# Patient Record
Sex: FEMALE | Race: WHITE | NOT HISPANIC OR LATINO | Employment: OTHER | URBAN - METROPOLITAN AREA
[De-identification: names, ages, dates, MRNs, and addresses within clinical notes are randomized per-mention and may not be internally consistent; named-entity substitution may affect disease eponyms.]

---

## 2017-04-04 ENCOUNTER — TELEPHONE (OUTPATIENT)
Dept: NEUROSURGERY | Facility: CLINIC | Age: 82
End: 2017-04-04

## 2017-04-04 NOTE — TELEPHONE ENCOUNTER
----- Message from Marcy Koo sent at 4/4/2017  1:05 PM CDT -----  Contact: self @ [phone]  Pt received a recall letter requesting she make a f/u appt with dr headley around 5-28-17.  pls call pt with an appt.

## 2017-04-04 NOTE — TELEPHONE ENCOUNTER
----- Message from Milena Bernal RN sent at 4/4/2017 12:47 PM CDT -----  Contact: ANDER GUEVARA [[id number]]  Please call and schedule this patient to be seen by a PA tomorrow.  ----- Message -----     From: Rajiv Stahl     Sent: 4/4/2017  12:38 PM       To: Aníbal Alonso Staff    x_  1st Request  _  2nd Request  _  3rd Request        Who: ANDER GUEVARA [[id number]]    Why: Patient is having issues with her leg and states that it is shrinking. Please call back to advise. Patient requesting to be seen by tomorrow afternoon.    What Number to Call Back: [phone]    When to Expect a call back: (Before the end of the day)   -- if the call is after 12:00, the call back will be tomorrow.

## 2017-04-04 NOTE — TELEPHONE ENCOUNTER
RN spoke with patient. Appointment secured for 5/30/2017. Address on file verified. Location and directions provided to [facility]. Thanked RN for calling. Appointment letter mailed.

## 2017-04-04 NOTE — TELEPHONE ENCOUNTER
"Patient states she is having trouble with her right leg. Reports her leg "is getting shorter and looks like the bone is protruding out". Denies any pain other than her normal neuropathy. Offered patient appointment tomorrow with a PA. Patient declined appointment stating she has an appointment with her PCP tomorrow. Advised patient to consult with PCP tomorrow regarding her leg. It may be more appropriate for patient to be seen by Orthopedics. Advised patient to notify clinic if recommended she see Neurosurgery. Understanding verbalized.   "

## 2018-08-16 DIAGNOSIS — M79.642 LEFT HAND PAIN: Primary | ICD-10-CM

## 2018-08-27 ENCOUNTER — OFFICE VISIT (OUTPATIENT)
Dept: ORTHOPEDICS | Facility: CLINIC | Age: 83
End: 2018-08-27
Payer: MEDICARE

## 2018-08-27 ENCOUNTER — HOSPITAL ENCOUNTER (OUTPATIENT)
Dept: RADIOLOGY | Facility: HOSPITAL | Age: 83
Discharge: HOME | End: 2018-08-27
Attending: ORTHOPAEDIC SURGERY
Payer: MEDICARE

## 2018-08-27 VITALS
BODY MASS INDEX: 21.34 KG/M2 | DIASTOLIC BLOOD PRESSURE: 68 MMHG | HEIGHT: 62 IN | SYSTOLIC BLOOD PRESSURE: 141 MMHG | HEART RATE: 68 BPM | WEIGHT: 115.94 LBS

## 2018-08-27 DIAGNOSIS — M19.042: ICD-10-CM

## 2018-08-27 DIAGNOSIS — M79.642 LEFT HAND PAIN: ICD-10-CM

## 2018-08-27 PROCEDURE — 73130 X-RAY EXAM OF HAND: CPT | Mod: TC,FY,LT

## 2018-08-27 PROCEDURE — 73130 X-RAY EXAM OF HAND: CPT | Mod: 26,LT,, | Performed by: RADIOLOGY

## 2018-08-27 PROCEDURE — 99213 OFFICE O/P EST LOW 20 MIN: CPT | Mod: PBBFAC,25 | Performed by: ORTHOPAEDIC SURGERY

## 2018-08-27 PROCEDURE — 99999 PR PBB SHADOW E&M-EST. PATIENT-LVL III: CPT | Mod: PBBFAC,,, | Performed by: ORTHOPAEDIC SURGERY

## 2018-08-27 PROCEDURE — 99203 OFFICE O/P NEW LOW 30 MIN: CPT | Mod: S$PBB,,, | Performed by: ORTHOPAEDIC SURGERY

## 2018-08-27 NOTE — PROGRESS NOTES
"  Subjective:      Patient ID: Anastasia Soares is a 82 y.o. female.    Chief Complaint: Pain of the Left Hand    Referring Provider: No referring provider defined for this encounter.     HPI:  Ms. Soares is an 82-year-old right-hand-dominant female who presented today with complaints of malalignment of the small fingers of both of her hands and bumps forming at the PIP of her left ring finger.  She stated is progressively worsening over the last year.  She stated, They're real sore.  My hands hurt "  She has not taken NSAIDs.  She has not done occupational therapy.  Using her hands increase her symptoms.    Past Medical History:   Diagnosis Date    Arthritis     Heredia's esophagus     Esophageal stricture     GERD (gastroesophageal reflux disease)     Hyperlipidemia     PUD (peptic ulcer disease)      *  *  *  *  * Right peroneal nerve injury  Seasonal allergies  Depression  Anxiety  Headaches  Pain management 3/18/2016     Past Surgical History:   Procedure Laterality Date    BACK SURGERY      HYSTERECTOMY      lap hiatal hernia and Nissen fundoplication      NECK SURGERY       *  *  *  *  *  *  * WRIST FRACTURE SURGERY  Tubal ligation  Right breast biopsy  Breast augmentation  EGD  Colonoscopy  Cataract removal bilateral eyes  DIP fusion left middle finger.       Review of patient's allergies indicates:   Allergen Reactions    Codeine Itching    Sulfa (sulfonamide antibiotics) Itching     Social History     Occupational History    Retired      Tobacco Use    Smoking status: Former Smoker     Years: 20.00     Types: Cigarettes     Last attempt to quit: 2012     Years since quittin.6   Substance and Sexual Activity    Alcohol use: No    Drug use: No    Sexual activity: Not on file      Family History   Problem Relation Age of Onset    Heart disease Mother      Previous Hospitalizations:  Childbirth 3 times, spine surgery.    Review of Systems   Constitution: Negative " for chills and fever.   HENT: Negative for congestion.    Eyes: Negative for double vision.   Cardiovascular: Negative for chest pain.   Respiratory: Negative for cough.    Endocrine: Negative for polydipsia.   Hematologic/Lymphatic: Does not bruise/bleed easily.   Skin: Negative for poor wound healing.   Musculoskeletal: Positive for joint pain.   Gastrointestinal: Negative for constipation and diarrhea.   Genitourinary: Negative for flank pain.   Neurological: Positive for headaches.   Psychiatric/Behavioral: Positive for depression. The patient is nervous/anxious.    Allergic/Immunologic: Positive for environmental allergies.          Objective:      Physical Exam:  General: AAOx3.  No acute distress  HEENT: Normocephalic, PEARLA EOMI, Fair Dentition  Neck: Supple, No JVD  Chest: Symetric, equal excursion on inspiration  Abdomen: Soft NTND  Vascular:  Pulses intact and equal bilaterally.  Capillary refill less than 3 seconds and equal bilaterally  Neurologic:  Pinprick and soft touch intact and equal bilaterally  Integment:  No ecchymosis, no errythema  Extremity:  Hand:  Pronation/supination equal bilaterally 90/85 degrees. Dorsiflexion/volar flexion equal bilaterally 80/75 degrees. Nontender in the anatomic snuffbox bilaterally.  Nontender at the 1st dorsal compartment bilaterally. No swelling at the 1st dorsal compartment both wrists.  Nontender at the scapholunate interval both wrists.  No swelling at the scapholunate interval both wrists.  Nontender at the DRUJ/TFCC.  Nontender with forced ulnar deviation both wrists.  Ulnar deviation at PIP and DIP small fingers both hands.  Crepitus with motion joints of small fingers both hands.  Mild prominence dorsal condyles DIP left ring finger.  Tender with palpation DIP and PIP small fingers both hands.  Radiography:  Personally reviewed x-rays of the left hand completed on 08/27/2018 showed a DIP fusion with a metallic screw of the middle phalanx and arthritic  changes with severe arthritis of the DIP and PIP of the small finger and ring finger.      Assessment:       Impression:     1.  2.  3.  4.  5. PIP arthritis with angulation, left small finger.  DIP arthritis with angulation, left small finger.  PIP arthritis with angulation, right small finger.  DIP arthritis with angulation, right small finger.  DIP arthritis, left ring finger.         Plan:       1.  Discussed physical examination and radiographic findings with the patient. Anastasia understands that she has arthritis in the fingers of her hands which is progressing to angulation.  Discussed with the patient that since she has multiple involved fingers she may be better treated by seeing an orthopedic hand surgeon.  She states she would like to be referred to an orthopedic hand surgeon.     2.  Referred to an orthopedic hand surgeon.  3.  Discussed possible referral to OT, she declined for now stating she would like to save it for postoperatively if she goes to surgery.  4.  Home exercises to include include motion exercises were shown discussed with the patient.  5.  Would not recommend this patient take NSAIDs as she has Heredia's esophagitis.  6.  Follow up p.r.n., x-ray right hand at follow-up.

## 2020-11-13 ENCOUNTER — OFFICE VISIT (OUTPATIENT)
Dept: CARDIOLOGY | Facility: CLINIC | Age: 85
End: 2020-11-13
Payer: MEDICARE

## 2020-11-13 VITALS
RESPIRATION RATE: 16 BRPM | WEIGHT: 119 LBS | HEIGHT: 61 IN | OXYGEN SATURATION: 98 % | SYSTOLIC BLOOD PRESSURE: 138 MMHG | BODY MASS INDEX: 22.47 KG/M2 | DIASTOLIC BLOOD PRESSURE: 72 MMHG | HEART RATE: 74 BPM

## 2020-11-13 DIAGNOSIS — M54.2 NECK PAIN: ICD-10-CM

## 2020-11-13 DIAGNOSIS — R94.39 ABNORMAL FINDING ON CARDIOVASCULAR STRESS TEST: ICD-10-CM

## 2020-11-13 DIAGNOSIS — E78.2 MIXED HYPERLIPIDEMIA: ICD-10-CM

## 2020-11-13 DIAGNOSIS — R94.31 NONSPECIFIC ABNORMAL ELECTROCARDIOGRAM (ECG) (EKG): Primary | ICD-10-CM

## 2020-11-13 DIAGNOSIS — M54.9 BACK PAIN, UNSPECIFIED BACK LOCATION, UNSPECIFIED BACK PAIN LATERALITY, UNSPECIFIED CHRONICITY: ICD-10-CM

## 2020-11-13 DIAGNOSIS — R63.4 WEIGHT LOSS: ICD-10-CM

## 2020-11-13 PROCEDURE — 99214 PR OFFICE/OUTPT VISIT, EST, LEVL IV, 30-39 MIN: ICD-10-PCS | Mod: S$GLB,,, | Performed by: INTERNAL MEDICINE

## 2020-11-13 PROCEDURE — 99214 OFFICE O/P EST MOD 30 MIN: CPT | Mod: S$GLB,,, | Performed by: INTERNAL MEDICINE

## 2020-11-13 RX ORDER — INFLUENZA A VIRUS A/MICHIGAN/45/2015 X-275 (H1N1) ANTIGEN (FORMALDEHYDE INACTIVATED), INFLUENZA A VIRUS A/SINGAPORE/INFIMH-16-0019/2016 IVR-186 (H3N2) ANTIGEN (FORMALDEHYDE INACTIVATED), INFLUENZA B VIRUS B/PHUKET/3073/2013 ANTIGEN (FORMALDEHYDE INACTIVATED), AND INFLUENZA B VIRUS B/MARYLAND/15/2016 BX-69A ANTIGEN (FORMALDEHYDE INACTIVATED) 60; 60; 60; 60 UG/.7ML; UG/.7ML; UG/.7ML; UG/.7ML
INJECTION, SUSPENSION INTRAMUSCULAR
COMMUNITY
Start: 2020-08-19

## 2020-11-13 RX ORDER — CYCLOSPORINE 0.5 MG/ML
EMULSION OPHTHALMIC
COMMUNITY
Start: 2020-11-02

## 2020-11-13 RX ORDER — CELECOXIB 100 MG/1
100 CAPSULE ORAL DAILY
COMMUNITY
Start: 2020-10-21

## 2020-11-13 RX ORDER — HYDROCODONE BITARTRATE AND ACETAMINOPHEN 5; 325 MG/1; MG/1
TABLET ORAL
COMMUNITY
Start: 2020-09-11

## 2020-11-13 RX ORDER — LINACLOTIDE 72 UG/1
CAPSULE, GELATIN COATED ORAL
COMMUNITY
Start: 2020-08-21

## 2020-11-13 RX ORDER — DULOXETIN HYDROCHLORIDE 30 MG/1
30 CAPSULE, DELAYED RELEASE ORAL DAILY
COMMUNITY
Start: 2020-09-23

## 2020-11-13 RX ORDER — DICLOFENAC SODIUM 10 MG/G
GEL TOPICAL
COMMUNITY
Start: 2020-09-11

## 2020-11-13 RX ORDER — PREGABALIN 25 MG/1
25 CAPSULE ORAL EVERY 12 HOURS
COMMUNITY
Start: 2020-10-22
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
## 2020-12-15 ENCOUNTER — TELEPHONE (OUTPATIENT)
Dept: CARDIOLOGY | Facility: CLINIC | Age: 85
End: 2020-12-15

## 2021-05-14 ENCOUNTER — OFFICE VISIT (OUTPATIENT)
Dept: CARDIOLOGY | Facility: CLINIC | Age: 86
End: 2021-05-14
Payer: MEDICARE

## 2021-05-14 VITALS
SYSTOLIC BLOOD PRESSURE: 140 MMHG | RESPIRATION RATE: 16 BRPM | OXYGEN SATURATION: 98 % | HEART RATE: 82 BPM | BODY MASS INDEX: 20.2 KG/M2 | HEIGHT: 61 IN | DIASTOLIC BLOOD PRESSURE: 70 MMHG | WEIGHT: 107 LBS

## 2021-05-14 DIAGNOSIS — M19.042: ICD-10-CM

## 2021-05-14 DIAGNOSIS — G62.9 NEUROPATHY: ICD-10-CM

## 2021-05-14 DIAGNOSIS — R94.31 NONSPECIFIC ST-T WAVE ELECTROCARDIOGRAPHIC CHANGES: Primary | ICD-10-CM

## 2021-05-14 DIAGNOSIS — R94.39 ABNORMAL FINDING ON CARDIOVASCULAR STRESS TEST: ICD-10-CM

## 2021-05-14 DIAGNOSIS — E78.2 MIXED HYPERLIPIDEMIA: ICD-10-CM

## 2021-05-14 PROBLEM — Z00.00 ANNUAL PHYSICAL EXAM: Status: ACTIVE | Noted: 2021-05-14

## 2021-05-14 PROCEDURE — 99214 PR OFFICE/OUTPT VISIT, EST, LEVL IV, 30-39 MIN: ICD-10-PCS | Mod: S$GLB,,, | Performed by: INTERNAL MEDICINE

## 2021-05-14 PROCEDURE — 99214 OFFICE O/P EST MOD 30 MIN: CPT | Mod: S$GLB,,, | Performed by: INTERNAL MEDICINE

## 2021-05-14 PROCEDURE — 93000 EKG 12-LEAD: ICD-10-PCS | Mod: S$GLB,,, | Performed by: SPECIALIST

## 2021-05-14 PROCEDURE — 93000 ELECTROCARDIOGRAM COMPLETE: CPT | Mod: S$GLB,,, | Performed by: SPECIALIST

## 2021-05-14 RX ORDER — FAMOTIDINE 40 MG/1
40 TABLET, FILM COATED ORAL
COMMUNITY
Start: 2020-12-02

## 2021-05-14 RX ORDER — GENTAMICIN SULFATE 3 MG/ML
SOLUTION/ DROPS OPHTHALMIC
COMMUNITY
Start: 2021-02-25

## 2021-05-14 RX ORDER — DOCUSATE SODIUM 100 MG/1
100 CAPSULE, LIQUID FILLED ORAL
COMMUNITY
Start: 2020-07-31

## 2021-05-14 RX ORDER — OMEPRAZOLE 40 MG/1
40 CAPSULE, DELAYED RELEASE ORAL
COMMUNITY
Start: 2021-05-11 | End: 2022-05-06

## 2021-08-16 PROBLEM — Z00.00 ANNUAL PHYSICAL EXAM: Status: RESOLVED | Noted: 2021-05-14 | Resolved: 2021-08-16

## 2022-01-07 ENCOUNTER — OFFICE VISIT (OUTPATIENT)
Dept: CARDIOLOGY | Facility: CLINIC | Age: 87
End: 2022-01-07
Payer: MEDICARE

## 2022-01-07 VITALS
OXYGEN SATURATION: 98 % | BODY MASS INDEX: 20.2 KG/M2 | HEIGHT: 61 IN | HEART RATE: 87 BPM | RESPIRATION RATE: 16 BRPM | WEIGHT: 107 LBS | DIASTOLIC BLOOD PRESSURE: 76 MMHG | SYSTOLIC BLOOD PRESSURE: 138 MMHG

## 2022-01-07 DIAGNOSIS — R94.31 NONSPECIFIC ABNORMAL ELECTROCARDIOGRAM (ECG) (EKG): ICD-10-CM

## 2022-01-07 DIAGNOSIS — R94.39 ABNORMAL FINDING ON CARDIOVASCULAR STRESS TEST: Primary | ICD-10-CM

## 2022-01-07 DIAGNOSIS — E78.2 MIXED HYPERLIPIDEMIA: ICD-10-CM

## 2022-01-07 DIAGNOSIS — R63.4 WEIGHT LOSS: ICD-10-CM

## 2022-01-07 DIAGNOSIS — M54.2 NECK PAIN: ICD-10-CM

## 2022-01-07 DIAGNOSIS — M19.042: ICD-10-CM

## 2022-01-07 DIAGNOSIS — G89.29 CHRONIC LEFT-SIDED LOW BACK PAIN WITHOUT SCIATICA: ICD-10-CM

## 2022-01-07 DIAGNOSIS — M54.50 CHRONIC LEFT-SIDED LOW BACK PAIN WITHOUT SCIATICA: ICD-10-CM

## 2022-01-07 PROCEDURE — 99214 OFFICE O/P EST MOD 30 MIN: CPT | Mod: S$GLB,,, | Performed by: INTERNAL MEDICINE

## 2022-01-07 PROCEDURE — 99214 PR OFFICE/OUTPT VISIT, EST, LEVL IV, 30-39 MIN: ICD-10-PCS | Mod: S$GLB,,, | Performed by: INTERNAL MEDICINE

## 2022-01-07 PROCEDURE — 93000 EKG 12-LEAD: ICD-10-PCS | Mod: S$GLB,,, | Performed by: INTERNAL MEDICINE

## 2022-01-07 PROCEDURE — 93000 ELECTROCARDIOGRAM COMPLETE: CPT | Mod: S$GLB,,, | Performed by: INTERNAL MEDICINE

## 2022-01-07 NOTE — PROGRESS NOTES
Subjective:    Patient ID:  Anastasia Soares is a 86 y.o. female       Chief Complaint   Patient presents with    Follow-up       HPI:  Ms Anastasia Soares is a 86 y.o. female is here for follow-up.  Patient has been doing well except that she still has neck pain and back pain.  She has had surgery in the past for the same.  Her breathing is good denies any shortness of breath or difficulty in breathing denies any chest pain or tightness or heaviness denies any dizziness or lightheadedness or loss of can consciousness.  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  And patient has been stressed in regards with.    Review of patient's allergies indicates:   Allergen Reactions    Codeine Itching     Other reaction(s): ITCHING    Simvastatin     Sulfa (sulfonamide antibiotics) Itching    Sulfur      Other reaction(s): ITCHING       Past Medical History:   Diagnosis Date    Arthritis     Heredia's esophagus     Esophageal stricture     GERD (gastroesophageal reflux disease)     Hyperlipidemia     PUD (peptic ulcer disease)     Right peroneal nerve injury 3/18/2016     Past Surgical History:   Procedure Laterality Date    BACK SURGERY      HYSTERECTOMY      lap hiatal hernia and Nissen fundoplication      NECK SURGERY      WRIST FRACTURE SURGERY       Social History     Tobacco Use    Smoking status: Former Smoker     Years: 20.00     Types: Cigarettes     Quit date: 2012     Years since quittin.0    Smokeless tobacco: Never Used   Substance Use Topics    Alcohol use: No    Drug use: No     Family History   Problem Relation Age of Onset    Heart disease Mother     Hypertension Mother     Stroke Mother     Cancer Sister     Clotting disorder Sister         Review of Systems:   Constitution: Negative for diaphoresis and fever.   HEENT: Negative for nosebleeds.    Cardiovascular: Negative for chest pain       No dyspnea on exertion       No leg swelling         No palpitations  Respiratory: Negative for shortness of breath and wheezing.    Hematologic/Lymphatic: Negative for bleeding problem. Does not bruise/bleed easily.   Skin: Negative for color change and rash.   Musculoskeletal: Negative for falls and myalgias.  Neck and back pain.  Gastrointestinal: Negative for hematemesis and hematochezia.   Genitourinary: Negative for hematuria.   Neurological: Negative for dizziness and light-headedness.   Psychiatric/Behavioral: Negative for altered mental status and memory loss.          Objective:        Vitals:    01/07/22 1108   BP: 138/76   Pulse: 87   Resp: 16       Lab Results   Component Value Date    WBC 4.60 04/19/2016    HGB 11.4 (L) 04/19/2016    HCT 35.1 (L) 04/19/2016     04/19/2016    ALT 16 04/18/2016    AST 23 04/18/2016     04/19/2016    K 4.4 04/19/2016     04/19/2016    CREATININE 0.9 04/19/2016    BUN 12 04/19/2016    CO2 24 04/19/2016    INR 0.9 04/18/2016        ECHOCARDIOGRAM RESULTS  No results found for this or any previous visit.        CURRENT/PREVIOUS VISIT EKG  Results for orders placed or performed in visit on 05/14/21   IN OFFICE EKG 12-LEAD (to [place])    Collection Time: 05/14/21  1:16 PM    Narrative    Test Reason : R94.31,    Vent. Rate : 078 BPM     Atrial Rate : 078 BPM     P-R Int : 172 ms          QRS Dur : 060 ms      QT Int : 368 ms       P-R-T Axes : 045 080 081 degrees     QTc Int : 419 ms    Normal sinus rhythm  Normal ECG  No previous ECGs available  Confirmed by Tyler MELARA, Chalo CLAY ([id number]) on 5/19/2021 11:28:45 AM    Referred By:             Confirmed By:Chalo Scott MD     No valid procedures specified.   No results found for this or any previous visit.      Physical Exam:  CONSTITUTIONAL: No fever, no chills  HEENT: Normocephalic, atraumatic,pupils reactive to light                 NECK:  No JVD no carotid bruit  CVS: S1S2+, RRR, no murmurs,   LUNGS: Clear  ABDOMEN: Soft, NT, BS+  EXTREMITIES: No  cyanosis, edema  : No luis catheter  NEURO: AAO X 3  PSY: Normal affect      Medication List with Changes/Refills   Current Medications    ALUMINUM-MAGNESIUM HYDROXIDE-SIMETHICONE 200-200-20 MG/5 ML SUSP 30 ML, LIDOCAINE HCL 2% 2 % SOLN 15 ML, PHENOBARB-HYOSCY-ATROPINE-SCOP 16.2-0.1037 -0.0194 MG/5 ML ELIX 10 ML    Take by mouth as needed.    BACLOFEN (LIORESAL) 10 MG TABLET    Take 10 mg by mouth 3 (three) times daily as needed.    CELECOXIB (CELEBREX) 100 MG CAPSULE    Take 100 mg by mouth once daily.    DEXLANSOPRAZOLE (DEXILANT) 30 MG CPDM    Take by mouth as needed.    DICLOFENAC SODIUM (VOLTAREN) 1 % GEL    APPLY 2 GRAMS TO AFFECTED AREA(S) EVERY 8 HOURS    DOCUSATE SODIUM (COLACE) 100 MG CAPSULE    100 mg.    DULOXETINE (CYMBALTA) 30 MG CAPSULE    Take 30 mg by mouth once daily.    FAMOTIDINE (PEPCID) 40 MG TABLET    40 mg.    FLUZONE HIGHDOSE QUAD 20-21  MCG/0.7 ML SYRG    PHARMACIST ADMINISTERED IMMUNIZATION ADMINISTERED AT TIME OF DISPENSING    GABAPENTIN (NEURONTIN) 100 MG CAPSULE    Take 1 capsule (100 mg total) by mouth 3 (three) times daily.    GENTAMICIN (GARAMYCIN) 0.3 % OPHTHALMIC SOLUTION        HYDROCODONE-ACETAMINOPHEN (NORCO) 5-325 MG PER TABLET    TAKE 1 TABLET BY MOUTH ONCE DAILY AT BEDTIME AS NEEDED FOR PAIN    LANSOPRAZOLE (PREVACID) 30 MG CAPSULE    Take 30 mg by mouth once daily.    LINZESS 72 MCG CAP CAPSULE    TAKE 1 CAPSULE BY MOUTH ONCE DAILY. DO NOT CRUSH OR CHEW    OMEPRAZOLE (PRILOSEC) 40 MG CAPSULE    40 mg.    PREGABALIN (LYRICA) 25 MG CAPSULE    Take 25 mg by mouth every 12 (twelve) hours.    RESTASIS MULTIDOSE 0.05 % DROP    INSTILL 1 DROP INTO EACH EYE TWICE DAILY    TRAMADOL (ULTRAM) 50 MG TABLET    Take 50 mg by mouth every 6 (six) hours as needed for Pain.             Assessment:       1. Abnormal finding on cardiovascular stress test    2. Nonspecific abnormal electrocardiogram (ECG) (EKG)    3. Mixed hyperlipidemia    4. Weight loss    5. Neck pain    6. Chronic  left-sided low back pain without sciatica    7. Arthritis, degenerative, localized, primary, hand, left         Plan:   1. Patient at the present time is doing well.  Her blood pressure is well controlled is 138/76 and currently not on any blood pressure medications.  2. Patient has significant neck and back pain.  And is scheduled to see her surgeon.  She does have neuropathy since the surgery.  3. Reviewed her EKG independently patient is in normal sinus rhythm with heart rate of 87 beats per minute and normal EKG with normal intervals.  4. Patient had an abnormal stress Cardiolite she had a small inferior wall defect and had a cardiac catheterization and coronary angiography in April of 2018 and patient had normal patent coronaries.  5. Patient to continue to walk and be active.  6. Patient is scheduled to have a neck and back massage to relieve the spasm.  7. Continue current management and I will see her back in the office in a year's time.            Problem List Items Addressed This Visit        Cardiac/Vascular    Mixed hyperlipidemia    Abnormal finding on cardiovascular stress test - Primary    Relevant Orders    IN OFFICE EKG 12-LEAD (to [facility])    Nonspecific abnormal electrocardiogram (ECG) (EKG)       Endocrine    Weight loss       Orthopedic    Arthritis, degenerative, localized, primary, hand, left    Neck pain    Back pain          No follow-ups on file.

## 2022-07-21 ENCOUNTER — TELEPHONE (OUTPATIENT)
Dept: CARDIOLOGY | Facility: CLINIC | Age: 87
End: 2022-07-21
Payer: MEDICARE

## 2022-07-21 NOTE — LETTER
2022    Anastasia Soares  [address] [place] [address]             [facility] - Cardiology  [address]  [place] [address]  Phone: [phone]  Fax: [phone] Patient: Anastasia Soares  : 1935  Referring Doctor:Dr. Castillo  Colonoscopy TBD    Current Outpatient Medications   Medication Sig    aluminum-magnesium hydroxide-simethicone 200-200-20 mg/5 mL Susp 30 mL, lidocaine HCl 2% 2 % Soln 15 mL, phenobarb-hyoscy-atropine-scop 16.2-0.1037 -0.0194 mg/5 mL Elix 10 mL Take by mouth as needed.    baclofen (LIORESAL) 10 MG tablet Take 10 mg by mouth 3 (three) times daily as needed.    celecoxib (CELEBREX) 100 MG capsule Take 100 mg by mouth once daily.    dexlansoprazole (DEXILANT) 30 mg CpDM Take by mouth as needed.    diclofenac sodium (VOLTAREN) 1 % Gel APPLY 2 GRAMS TO AFFECTED AREA(S) EVERY 8 HOURS    docusate sodium (COLACE) 100 MG capsule 100 mg.    DULoxetine (CYMBALTA) 30 MG capsule Take 30 mg by mouth once daily.    famotidine (PEPCID) 40 MG tablet 40 mg.    FLUZONE HIGHDOSE QUAD 20-21  mcg/0.7 mL Syrg PHARMACIST ADMINISTERED IMMUNIZATION ADMINISTERED AT TIME OF DISPENSING    gabapentin (NEURONTIN) 100 MG capsule Take 1 capsule (100 mg total) by mouth 3 (three) times daily.    gentamicin (GARAMYCIN) 0.3 % ophthalmic solution     HYDROcodone-acetaminophen (NORCO) 5-325 mg per tablet TAKE 1 TABLET BY MOUTH ONCE DAILY AT BEDTIME AS NEEDED FOR PAIN    lansoprazole (PREVACID) 30 MG capsule Take 30 mg by mouth once daily.    LINZESS 72 mcg Cap capsule TAKE 1 CAPSULE BY MOUTH ONCE DAILY. DO NOT CRUSH OR CHEW    omeprazole (PRILOSEC) 40 MG capsule 40 mg.    pregabalin (LYRICA) 25 MG capsule Take 25 mg by mouth every 12 (twelve) hours.    RESTASIS MULTIDOSE 0.05 % Drop INSTILL 1 DROP INTO EACH EYE TWICE DAILY    tramadol (ULTRAM) 50 mg tablet Take 50 mg by mouth every 6 (six) hours as needed for Pain.     No current facility-administered medications for  this visit.       This patient has been assessed for risk factors for clearance of surgery with the following stipulations:    ___ No contraindications  ___ Recommendations for antiplatelet/anticoagulant medications:  _x__ Cleared for surgery with the following contraindications/precautions: mild risks.  ___ Not cleared for surgery due to the following reasons:      If you have any questions regarding the above, please contact my office at [phone].    Sincerely,      Jordana Talbot NPMehranC  [facility] [place]

## 2023-02-15 ENCOUNTER — TELEPHONE (OUTPATIENT)
Dept: CARDIOLOGY | Facility: CLINIC | Age: 88
End: 2023-02-15
Payer: MEDICARE

## 2023-02-15 NOTE — TELEPHONE ENCOUNTER
Patient to have EGD/Colonoscopy with Dr. Shabbir Castillo on tbd. Please send letter with recommendations.

## 2023-04-25 ENCOUNTER — TELEPHONE (OUTPATIENT)
Dept: CARDIOLOGY | Facility: CLINIC | Age: 88
End: 2023-04-25
Payer: MEDICARE

## 2023-04-25 NOTE — TELEPHONE ENCOUNTER
----- Message from Marquise Boggs sent at 4/25/2023  8:37 AM CDT -----  Type: Needs Medical Advice  Who Called:  [facility]  Symptoms (please be specific):  said she need to speak to the office about a medical clearance for pt--please call and advise  Best Call Back Number: [phone]  Additional Information: thank you

## 2023-04-25 NOTE — TELEPHONE ENCOUNTER
----- Message from Denice Angeles sent at 4/25/2023  2:45 PM CDT -----  Contact: rep/[phone]  Type:  Rep Call    Who Called:mildred ([facility])  Would the patient rather a call back or a response via [facility]? Call   Best Call Back Number:[phone]  Additional Information: Surgery  clearance  is  needed  for pt, please  fax  cardiac  clearance  to  [phone]